# Patient Record
Sex: MALE | Race: BLACK OR AFRICAN AMERICAN | NOT HISPANIC OR LATINO | Employment: STUDENT | URBAN - METROPOLITAN AREA
[De-identification: names, ages, dates, MRNs, and addresses within clinical notes are randomized per-mention and may not be internally consistent; named-entity substitution may affect disease eponyms.]

---
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
## 2022-01-07 ENCOUNTER — HOSPITAL ENCOUNTER (EMERGENCY)
Facility: HOSPITAL | Age: 22
Discharge: HOME | End: 2022-01-07
Attending: EMERGENCY MEDICINE
Payer: MEDICAID

## 2022-01-07 VITALS
DIASTOLIC BLOOD PRESSURE: 83 MMHG | RESPIRATION RATE: 18 BRPM | HEART RATE: 70 BPM | BODY MASS INDEX: 20.81 KG/M2 | SYSTOLIC BLOOD PRESSURE: 143 MMHG | WEIGHT: 145 LBS | TEMPERATURE: 99 F | OXYGEN SATURATION: 99 %

## 2022-01-07 DIAGNOSIS — J02.9 PHARYNGITIS, UNSPECIFIED ETIOLOGY: Primary | ICD-10-CM

## 2022-01-07 DIAGNOSIS — U07.1 COVID: ICD-10-CM

## 2022-01-07 LAB
CTP QC/QA: YES
GROUP A STREP, MOLECULAR: NEGATIVE
SARS-COV-2 RDRP RESP QL NAA+PROBE: POSITIVE

## 2022-01-07 PROCEDURE — 99284 EMERGENCY DEPT VISIT MOD MDM: CPT | Mod: 25

## 2022-01-07 PROCEDURE — 96372 THER/PROPH/DIAG INJ SC/IM: CPT

## 2022-01-07 PROCEDURE — 96374 THER/PROPH/DIAG INJ IV PUSH: CPT

## 2022-01-07 PROCEDURE — 99284 EMERGENCY DEPT VISIT MOD MDM: CPT | Mod: CS,,, | Performed by: PHYSICIAN ASSISTANT

## 2022-01-07 PROCEDURE — 99284 PR EMERGENCY DEPT VISIT,LEVEL IV: ICD-10-PCS | Mod: CS,,, | Performed by: PHYSICIAN ASSISTANT

## 2022-01-07 PROCEDURE — 87651 STREP A DNA AMP PROBE: CPT | Performed by: PHYSICIAN ASSISTANT

## 2022-01-07 PROCEDURE — U0002 COVID-19 LAB TEST NON-CDC: HCPCS | Performed by: PHYSICIAN ASSISTANT

## 2022-01-07 PROCEDURE — 63600175 PHARM REV CODE 636 W HCPCS: Mod: JG | Performed by: PHYSICIAN ASSISTANT

## 2022-01-07 RX ORDER — DEXAMETHASONE SODIUM PHOSPHATE 4 MG/ML
8 INJECTION, SOLUTION INTRA-ARTICULAR; INTRALESIONAL; INTRAMUSCULAR; INTRAVENOUS; SOFT TISSUE
Status: COMPLETED | OUTPATIENT
Start: 2022-01-07 | End: 2022-01-07

## 2022-01-07 RX ADMIN — PENICILLIN G BENZATHINE AND PENICILLIN G PROCAINE 1.2 MILLION UNITS: 600000; 600000 INJECTION, SUSPENSION INTRAMUSCULAR at 03:01

## 2022-01-07 RX ADMIN — DEXAMETHASONE SODIUM PHOSPHATE 8 MG: 4 INJECTION INTRA-ARTICULAR; INTRALESIONAL; INTRAMUSCULAR; INTRAVENOUS; SOFT TISSUE at 02:01

## 2022-01-07 NOTE — DISCHARGE INSTRUCTIONS
Monitor your symptoms.  Take over-the-counter medication for symptoms such as decongestants, cough suppressants, sore throat medication, etc.  Take acetaminophen/Tylenol 650 mg every 6 hr for pain relief for fever reduction.  You can take ibuprofen 600 mg every 6 hr for additional relief.  Rest.  Stay hydrated.  Continue to social isolate.  Quarantine for 5 days after the 1st day of symptom onset or 72 hr after your last fever without fever reducing medication. You will need to wear you mask for an additional 5 days after that.  If you developed shortness of breath, please  a pulse oximeter that is over-the-counter.  Return to the ER or follow up if you oxygen saturations of 93% or below on room air.   Return to the ED for any concerning signs or symptoms.

## 2022-01-07 NOTE — ED NOTES
Patient identifiers verified and correct for MR Burleson  C/C: Sore Throat SEE NN  APPEARANCE: awake and alert in NAD.  SKIN: warm, dry and intact. No breakdown or bruising.  MUSCULOSKELETAL: Patient moving all extremities spontaneously, no obvious swelling or deformities noted. Ambulates independently.  RESPIRATORY: Denies shortness of breath.Respirations unlabored.   CARDIAC: Denies CP, 2+ distal pulses; no peripheral edema  ABDOMEN: S/ND/NT, Denies nausea  : voids spontaneously, denies difficulty  Neurologic: AAO x 4; follows commands equal strength in all extremities; denies numbness/tingling. Denies dizziness denies weakness, reports right sided throat pain

## 2022-01-07 NOTE — ED PROVIDER NOTES
Encounter Date: 1/7/2022       History     Chief Complaint   Patient presents with    Sore Throat     Hx of issues with tonsils     21-year-old male with history of asthma, eczema presents to the emergency department for sore throat.  Patient reports 4 days of sore throat as well as subjective fever and chills 2 days ago.  Patient states that his pain with swallowing which is worse on the right side.  Reports that he has a history of tonsil infections in the past and was admitted for peritonsillar abscess 3 years ago.  Patient denies any changes in phonation, drooling, shortness of breath or trismus.        Review of patient's allergies indicates:   Allergen Reactions    Pineapple Itching and Swelling     Past Medical History:   Diagnosis Date    ADHD     Asthma     Eczema     Groin swelling     2012; had u/s - results     Past Surgical History:   Procedure Laterality Date    CIRCUMCISION      CIRCUMCISION, PRIMARY      TESTICLE SURGERY       History reviewed. No pertinent family history.  Social History     Tobacco Use    Smoking status: Passive Smoke Exposure - Never Smoker    Smokeless tobacco: Never Used   Substance Use Topics    Alcohol use: No    Drug use: Yes     Types: Marijuana     Review of Systems   Constitutional: Positive for chills and fever.   HENT: Positive for sore throat. Negative for rhinorrhea.    Eyes: Negative for pain and visual disturbance.   Respiratory: Negative for cough and shortness of breath.    Cardiovascular: Negative for chest pain.   Gastrointestinal: Negative for abdominal pain, diarrhea, nausea and vomiting.   Endocrine: Negative.    Genitourinary: Negative for difficulty urinating and dysuria.   Musculoskeletal: Negative.    Skin: Negative.    Allergic/Immunologic: Negative for immunocompromised state.   Neurological: Negative for headaches.   Hematological: Negative for adenopathy.   Psychiatric/Behavioral: Negative for confusion.       Physical Exam     Initial  Vitals [01/07/22 1316]   BP Pulse Resp Temp SpO2   (!) 143/83 (!) 56 18 98.6 °F (37 °C) 99 %      MAP       --         Physical Exam    Nursing note and vitals reviewed.  Constitutional: He appears well-developed and well-nourished. He is not diaphoretic. No distress.   HENT:   Head: Normocephalic and atraumatic.   Airway patent, no stridor, drooling or trismus   Eyes: Conjunctivae are normal. Pupils are equal, round, and reactive to light.   Neck: Neck supple.   Shoddy anterior cervical lymphadenopathy   Normal range of motion.  Cardiovascular: Normal rate, regular rhythm, normal heart sounds and intact distal pulses.   Pulmonary/Chest: Breath sounds normal.   Abdominal: Abdomen is soft. Bowel sounds are normal.   Musculoskeletal:         General: Normal range of motion.      Cervical back: Normal range of motion and neck supple.     Neurological: He is alert and oriented to person, place, and time. GCS score is 15. GCS eye subscore is 4. GCS verbal subscore is 5. GCS motor subscore is 6.   Skin: Skin is warm and dry. Capillary refill takes less than 2 seconds.   Psychiatric: He has a normal mood and affect.         ED Course   Procedures  Labs Reviewed   SARS-COV-2 RDRP GENE - Abnormal; Notable for the following components:       Result Value    POC Rapid COVID Positive (*)     All other components within normal limits    Narrative:     This test utilizes isothermal nucleic acid amplification   technology to detect the SARS-CoV-2 RdRp nucleic acid segment.   The analytical sensitivity (limit of detection) is 125 genome   equivalents/mL.   A POSITIVE result implies infection with the SARS-CoV-2 virus;   the patient is presumed to be contagious.     A NEGATIVE result means that SARS-CoV-2 nucleic acids are not   present above the limit of detection. A NEGATIVE result should be   treated as presumptive. It does not rule out the possibility of   COVID-19 and should not be the sole basis for treatment decisions.   If  COVID-19 is strongly suspected based on clinical and exposure   history, re-testing using an alternate molecular assay should be   considered.   This test is only for use under the Food and Drug   Administration s Emergency Use Authorization (EUA).   Commercial kits are provided by [employer].   Performance characteristics of the EUA have been independently   verified by [facility] Department of   Pathology and Laboratory Medicine.   _________________________________________________________________   The authorized Fact Sheet for Healthcare Providers and the authorized Fact   Sheet for Patients of the ID NOW COVID-19 are available on the FDA   website:     https://www.fda.gov/media/411615/download  https://www.fda.gov/media/611069/download       GROUP A STREP, MOLECULAR          Imaging Results    None          Medications   dexamethasone injection 8 mg (8 mg Intravenous Given 1/7/22 1426)   penicillin G procaine-penicillin G benzathine (BICILLIN-CR) injection 1.2 Million Units (1.2 Million Units Intramuscular Given 1/7/22 9179)     Medical Decision Making:   History:   Old Medical Records: I decided to obtain old medical records.  Old Records Summarized: records from clinic visits and records from previous admission(s).  Clinical Tests:   Lab Tests: Ordered and Reviewed       APC / Resident Notes:   21 y.o. year old male presenting with sore throat and subjective fever/chills x4 days .  On exam patient is afebrile and nontoxic. HEENT exam 2+ tonsillar swelling with bilateral exudate. Heart rate and rhythm are regular. Lungs with clear breath sounds throughout. Abdomen is soft, nontender. No edema.    DDx includes but is not limited to strep pharyngitis, URI, COVID    ED workup reveals COVID positive, strep negative.  Will treat with Decadron and Bicillin due clinical exam and history of recurrent strep and previous admission for peritonsillar abscess.  Patient found to be COVID-19 positive full  discharge instructions for home quarantine.  Patient return precautions for any new or worsening symptoms.      Discussed findings and plan with patient who verbalized understanding and agrees with the plan and course of treatment. Return to ED precautions discussed. Patient is stable for discharge. I discussed the care of this patient with my supervising physician.         Attending Attestation:     Physician Attestation Statement for NP/PA:   I discussed this assessment and plan of this patient with the NP/PA, but I did not personally examine the patient. The face to face encounter was performed by the NP/PA.                       Clinical Impression:   Final diagnoses:  [J02.9] Pharyngitis, unspecified etiology (Primary)  [U07.1] COVID          ED Disposition Condition    Discharge Stable        ED Prescriptions     None        Follow-up Information     Follow up With Specialties Details Why Contact Info    Rina Wellington MD Pediatrics  As needed [address]  [facility] [address]  [phone]             Latrell Garibay PA-C  01/07/22 2766       Casandra Kevin Jr., MD  01/08/22 1411

## 2022-07-29 ENCOUNTER — HOSPITAL ENCOUNTER (EMERGENCY)
Facility: HOSPITAL | Age: 22
Discharge: HOME | End: 2022-07-29
Attending: EMERGENCY MEDICINE
Payer: MEDICAID

## 2022-07-29 VITALS
RESPIRATION RATE: 20 BRPM | BODY MASS INDEX: 20.73 KG/M2 | HEIGHT: 69 IN | TEMPERATURE: 98 F | SYSTOLIC BLOOD PRESSURE: 135 MMHG | WEIGHT: 140 LBS | OXYGEN SATURATION: 100 % | HEART RATE: 78 BPM | DIASTOLIC BLOOD PRESSURE: 84 MMHG

## 2022-07-29 DIAGNOSIS — U07.1 COVID-19 VIRUS INFECTION: Primary | ICD-10-CM

## 2022-07-29 LAB
CTP QC/QA: YES
SARS-COV-2 RDRP RESP QL NAA+PROBE: POSITIVE

## 2022-07-29 PROCEDURE — 99282 EMERGENCY DEPT VISIT SF MDM: CPT

## 2022-07-29 PROCEDURE — U0002 COVID-19 LAB TEST NON-CDC: HCPCS | Performed by: STUDENT IN AN ORGANIZED HEALTH CARE EDUCATION/TRAINING PROGRAM

## 2022-07-29 PROCEDURE — 99281 EMR DPT VST MAYX REQ PHY/QHP: CPT

## 2022-07-29 NOTE — ED PROVIDER NOTES
"Encounter Date: 7/29/2022       History     Chief Complaint   Patient presents with    Fever     Subjective fever this am with generalized headache. 7/10 HA with: "achy back" that started today. Denies unilateral weakness, numbness, tingling, and dizziness.      Dayton Burleson is a 22 y.o. male who  has a past medical history of ADHD, Asthma, Eczema, and Groin swelling.    The patient presents to the ED due to subjective fever and body aches.  Patient reports symptoms started this morning. He work up feeling warm and has had some chills today as well. He did not take any medications for his symptoms.   He reports his mother ran fever recently, but she did not get evaluated.   He reports a mild runny nose, but denies any N/V/D, sore throat, cough, rash, urinary complaints, or any other concerns.   Denies any other recent illness.         Review of patient's allergies indicates:   Allergen Reactions    Pineapple Itching and Swelling     Past Medical History:   Diagnosis Date    ADHD     Asthma     Eczema     Groin swelling     2012; had u/s - results     Past Surgical History:   Procedure Laterality Date    CIRCUMCISION      CIRCUMCISION, PRIMARY      TESTICLE SURGERY       History reviewed. No pertinent family history.  Social History     Tobacco Use    Smoking status: Passive Smoke Exposure - Never Smoker    Smokeless tobacco: Never Used   Substance Use Topics    Alcohol use: No    Drug use: Yes     Types: Marijuana     Review of Systems   Constitutional: Positive for chills and fever.   HENT: Positive for rhinorrhea. Negative for sore throat.    Respiratory: Negative for shortness of breath.    Cardiovascular: Negative for chest pain.   Gastrointestinal: Negative for constipation, diarrhea, nausea and vomiting.   Genitourinary: Negative for dysuria, frequency and urgency.   Musculoskeletal: Positive for myalgias. Negative for back pain.   Skin: Negative for rash and wound.   Neurological: Negative for " weakness.   Hematological: Does not bruise/bleed easily.   Psychiatric/Behavioral: Negative for agitation, behavioral problems and confusion.       Physical Exam     Initial Vitals [07/29/22 1757]   BP Pulse Resp Temp SpO2   135/84 78 20 98.4 °F (36.9 °C) 100 %      MAP       --         Physical Exam    Nursing note and vitals reviewed.  Constitutional: He appears well-developed and well-nourished. He is not diaphoretic. No distress.   Well-appearing, no distress.    HENT:   Head: Normocephalic and atraumatic.   Mouth/Throat: Oropharynx is clear and moist.   Eyes: EOM are normal. Pupils are equal, round, and reactive to light.   Neck: No tracheal deviation present.   Cardiovascular: Normal rate, regular rhythm, normal heart sounds and intact distal pulses.   Pulmonary/Chest: Breath sounds normal. No stridor. No respiratory distress.   Abdominal: Abdomen is soft. He exhibits no distension and no mass. There is no abdominal tenderness.   Musculoskeletal:         General: No edema. Normal range of motion.      Cervical back: Normal. No bony tenderness. Normal range of motion.      Thoracic back: Normal. No bony tenderness. Normal range of motion.      Lumbar back: Tenderness present. No bony tenderness. Normal range of motion.        Back:       Comments: Mild muscular tenderness to neck and lower back.      Neurological: He is alert and oriented to person, place, and time. No cranial nerve deficit or sensory deficit.   Skin: Skin is warm and dry. Capillary refill takes less than 2 seconds. No rash noted.   Psychiatric: He has a normal mood and affect. His behavior is normal. Thought content normal.         ED Course   Procedures  Labs Reviewed   SARS-COV-2 RDRP GENE - Abnormal; Notable for the following components:       Result Value    POC Rapid COVID Positive (*)     All other components within normal limits          Imaging Results    None          Medications - No data to display  Medical Decision Making:    History:   Old Medical Records: I decided to obtain old medical records.  Old Records Summarized: records from clinic visits and other records.       <> Summary of Records: Seen by [facility] Clinic 3/2022 for sore throat, dysuria. GC/CZ negative.   Initial Assessment:   23 yo M with subjective fever, body aches.   COVID +. No hypoxia.  Will DC with supportive care.   Differential Diagnosis:   Differential Diagnosis includes, but is not limited to:  COVID-19 infection, influenza, bacterial sinusitis, allergic rhinitis, bacterial/viral pharyngitis, peritonsillar abscess, retropharyngeal abscess, bacterial/viral pneumonia.    Clinical Tests:   Lab Tests: Ordered and Reviewed  ED Management:  After complete evaluation, including thorough history and physical exam, patient's symptoms and presentation is most consistent with viral respiratory illness due to COVID-19.    Patient monitored in the ED and has remained stable on room air.  Vital signs do not indicate sepsis/severe infection, hypoxia, tachypnea, or respiratory distress, and in my professional opinion the benefits of hospital admission/observation DO NOT outweigh the risks of hospital-associated exposures, and the patient is well enough for discharge home with supportive care. The patient was counseled appropriately and provided with instructions for social isolation/home quarantine, hand washing, and symptomatic treatment. The patient was also given a work excuse, if needed. ED return precautions, including worsening symptoms or severe dyspnea, were discussed with the patient, who expressed understanding and is comfortable with plan at this time.      On re-evaluation, the patient's status has improved.  After complete ED evaluation, clinical impression is most consistent with COVID infection.  PCP follow-up within 2-3 days was recommended.    After taking into careful account the patient's history, physical exam findings, as well as empirical and objective data  obtained throughout ED workup, I feel no emergent medical condition has been identified. No further evaluation or admission was felt to be required, and the patient is stable for discharge from the ED. The patient and any additional family present were updated with test results, overall clinical impression, and recommended further plan of care, including discharge instructions as provided and outpatient follow-up for continued evaluation and management as needed. All questions were answered. The patient expressed understanding and agreed with current plan for discharge and follow-up plan of care. Strict ED return precautions were provided, including return/worsening of current symptoms, new symptoms, or any other concerns.                        Clinical Impression:   Final diagnoses:  [U07.1] COVID-19 virus infection (Primary)            ED Disposition Condition    Discharge Stable        ED Prescriptions     None        Follow-up Information     Follow up With Specialties Details Why Contact Info    Rina Wellington MD Pediatrics Schedule an appointment as soon as possible for a visit   [address]  [facility] [address]  [phone]             Chilo Delarosa MD  07/29/22 9746

## 2022-07-29 NOTE — Clinical Note
"Dayton "Neo Burleson was seen and treated in our emergency department on 7/29/2022.     COVID-19 is present in our communities across the state. There is limited testing for COVID at this time, so not all patients can be tested. In this situation, your employee meets the following criteria:    Dayton Burleson has met the criteria for COVID-19 testing and has a POSITIVE result. He can return to work once they are asymptomatic for 24 hours without the use of fever reducing medications AND at least five days from the first positive result. A mask is recommended for 5 days post quarantine.     If you have any questions or concerns, or if I can be of further assistance, please do not hesitate to contact me.    Sincerely,             Chilo Delarosa MD"

## 2022-07-29 NOTE — ED TRIAGE NOTES
Headache, generalized body aches with subjective fever that started this am. Denies diarrhea, n/v.     Two patient identifiers have been checked and are correct.      Appearance: Pt awake, alert & oriented to person, place & time. Pt in no acute distress at present time. Pt is clean and well groomed with clothes appropriately fastened.   Skin: Skin warm, dry & intact. Color consistent with ethnicity. Mucous membranes moist. No breakdown or brusing noted.   Musculoskeletal: Patient moving all extremities well, no obvious swelling or deformities noted.   Respiratory: Respirations spontaneous, even, and non-labored. Visible chest rise noted. Airway is open and patent. No accessory muscle use noted.   Neurologic: Sensation is intact. Speech is clear and appropriate. Eyes open spontaneously, behavior appropriate to situation, follows commands, facial expression symmetrical, bilateral hand grasp equal and even, purposeful motor response noted.  Cardiac: All peripheral pulses present. No Bilateral lower extremity edema. Cap refill is <3 seconds.  Abdomen: Abdomen soft, non-tender to palpation.   : Pt reports no dysuria or hematuria.

## 2024-08-10 ENCOUNTER — HOSPITAL ENCOUNTER (EMERGENCY)
Facility: HOSPITAL | Age: 24
Discharge: HOME | End: 2024-08-10
Attending: EMERGENCY MEDICINE
Payer: MEDICAID

## 2024-08-10 VITALS
WEIGHT: 139.75 LBS | SYSTOLIC BLOOD PRESSURE: 134 MMHG | HEART RATE: 80 BPM | TEMPERATURE: 99 F | HEIGHT: 69 IN | OXYGEN SATURATION: 100 % | BODY MASS INDEX: 20.7 KG/M2 | DIASTOLIC BLOOD PRESSURE: 63 MMHG | RESPIRATION RATE: 20 BRPM

## 2024-08-10 DIAGNOSIS — J03.90 TONSILLITIS: ICD-10-CM

## 2024-08-10 DIAGNOSIS — J36 ABSCESS, INTRATONSILLAR: Primary | ICD-10-CM

## 2024-08-10 LAB
ALBUMIN SERPL BCP-MCNC: 4.3 G/DL (ref 3.5–5.2)
ALP SERPL-CCNC: 79 U/L (ref 55–135)
ALT SERPL W/O P-5'-P-CCNC: 15 U/L (ref 10–44)
ANION GAP SERPL CALC-SCNC: 11 MMOL/L (ref 8–16)
AST SERPL-CCNC: 15 U/L (ref 10–40)
BASOPHILS # BLD AUTO: 0.02 K/UL (ref 0–0.2)
BASOPHILS NFR BLD: 0.2 % (ref 0–1.9)
BILIRUB SERPL-MCNC: 0.7 MG/DL (ref 0.1–1)
BUN SERPL-MCNC: 7 MG/DL (ref 6–20)
CALCIUM SERPL-MCNC: 9.7 MG/DL (ref 8.7–10.5)
CHLORIDE SERPL-SCNC: 108 MMOL/L (ref 95–110)
CO2 SERPL-SCNC: 21 MMOL/L (ref 23–29)
CREAT SERPL-MCNC: 0.8 MG/DL (ref 0.5–1.4)
DIFFERENTIAL METHOD BLD: ABNORMAL
EOSINOPHIL # BLD AUTO: 0.2 K/UL (ref 0–0.5)
EOSINOPHIL NFR BLD: 1.5 % (ref 0–8)
ERYTHROCYTE [DISTWIDTH] IN BLOOD BY AUTOMATED COUNT: 11.5 % (ref 11.5–14.5)
EST. GFR  (NO RACE VARIABLE): >60 ML/MIN/1.73 M^2
GLUCOSE SERPL-MCNC: 81 MG/DL (ref 70–110)
GROUP A STREP, MOLECULAR: NEGATIVE
HCT VFR BLD AUTO: 41.7 % (ref 40–54)
HCV AB SERPL QL IA: NORMAL
HGB BLD-MCNC: 14 G/DL (ref 14–18)
HIV 1+2 AB+HIV1 P24 AG SERPL QL IA: NORMAL
IMM GRANULOCYTES # BLD AUTO: 0.03 K/UL (ref 0–0.04)
IMM GRANULOCYTES NFR BLD AUTO: 0.3 % (ref 0–0.5)
LYMPHOCYTES # BLD AUTO: 1.5 K/UL (ref 1–4.8)
LYMPHOCYTES NFR BLD: 12.5 % (ref 18–48)
MCH RBC QN AUTO: 32 PG (ref 27–31)
MCHC RBC AUTO-ENTMCNC: 33.6 G/DL (ref 32–36)
MCV RBC AUTO: 95 FL (ref 82–98)
MONOCYTES # BLD AUTO: 1.2 K/UL (ref 0.3–1)
MONOCYTES NFR BLD: 9.8 % (ref 4–15)
NEUTROPHILS # BLD AUTO: 8.9 K/UL (ref 1.8–7.7)
NEUTROPHILS NFR BLD: 75.7 % (ref 38–73)
NRBC BLD-RTO: 0 /100 WBC
PLATELET # BLD AUTO: 170 K/UL (ref 150–450)
PMV BLD AUTO: 10.2 FL (ref 9.2–12.9)
POTASSIUM SERPL-SCNC: 3.8 MMOL/L (ref 3.5–5.1)
PROT SERPL-MCNC: 7.9 G/DL (ref 6–8.4)
RBC # BLD AUTO: 4.38 M/UL (ref 4.6–6.2)
SODIUM SERPL-SCNC: 140 MMOL/L (ref 136–145)
WBC # BLD AUTO: 11.68 K/UL (ref 3.9–12.7)

## 2024-08-10 PROCEDURE — 86803 HEPATITIS C AB TEST: CPT | Performed by: EMERGENCY MEDICINE

## 2024-08-10 PROCEDURE — 99285 EMERGENCY DEPT VISIT HI MDM: CPT | Mod: 25

## 2024-08-10 PROCEDURE — 96361 HYDRATE IV INFUSION ADD-ON: CPT

## 2024-08-10 PROCEDURE — 80053 COMPREHEN METABOLIC PANEL: CPT | Performed by: PHYSICIAN ASSISTANT

## 2024-08-10 PROCEDURE — 87389 HIV-1 AG W/HIV-1&-2 AB AG IA: CPT | Performed by: EMERGENCY MEDICINE

## 2024-08-10 PROCEDURE — 63600175 PHARM REV CODE 636 W HCPCS: Performed by: PHYSICIAN ASSISTANT

## 2024-08-10 PROCEDURE — 25500020 PHARM REV CODE 255: Performed by: EMERGENCY MEDICINE

## 2024-08-10 PROCEDURE — 96375 TX/PRO/DX INJ NEW DRUG ADDON: CPT | Mod: 59

## 2024-08-10 PROCEDURE — 85025 COMPLETE CBC W/AUTO DIFF WBC: CPT | Performed by: PHYSICIAN ASSISTANT

## 2024-08-10 PROCEDURE — 87651 STREP A DNA AMP PROBE: CPT | Performed by: PHYSICIAN ASSISTANT

## 2024-08-10 PROCEDURE — 25000003 PHARM REV CODE 250: Performed by: PHYSICIAN ASSISTANT

## 2024-08-10 PROCEDURE — 96374 THER/PROPH/DIAG INJ IV PUSH: CPT

## 2024-08-10 RX ORDER — KETOROLAC TROMETHAMINE 30 MG/ML
15 INJECTION, SOLUTION INTRAMUSCULAR; INTRAVENOUS
Status: COMPLETED | OUTPATIENT
Start: 2024-08-10 | End: 2024-08-10

## 2024-08-10 RX ORDER — DEXAMETHASONE SODIUM PHOSPHATE 4 MG/ML
8 INJECTION, SOLUTION INTRA-ARTICULAR; INTRALESIONAL; INTRAMUSCULAR; INTRAVENOUS; SOFT TISSUE
Status: COMPLETED | OUTPATIENT
Start: 2024-08-10 | End: 2024-08-10

## 2024-08-10 RX ORDER — AMOXICILLIN AND CLAVULANATE POTASSIUM 875; 125 MG/1; MG/1
1 TABLET, FILM COATED ORAL 2 TIMES DAILY
Qty: 20 TABLET | Refills: 0 | Status: SHIPPED | OUTPATIENT
Start: 2024-08-10 | End: 2024-08-20

## 2024-08-10 RX ORDER — AMOXICILLIN AND CLAVULANATE POTASSIUM 875; 125 MG/1; MG/1
1 TABLET, FILM COATED ORAL
Status: COMPLETED | OUTPATIENT
Start: 2024-08-10 | End: 2024-08-10

## 2024-08-10 RX ORDER — METHYLPREDNISOLONE 4 MG/1
TABLET ORAL
Qty: 21 EACH | Refills: 0 | Status: SHIPPED | OUTPATIENT
Start: 2024-08-10 | End: 2024-08-31

## 2024-08-10 RX ADMIN — SODIUM CHLORIDE 1000 ML: 9 INJECTION, SOLUTION INTRAVENOUS at 11:08

## 2024-08-10 RX ADMIN — IOHEXOL 75 ML: 350 INJECTION, SOLUTION INTRAVENOUS at 12:08

## 2024-08-10 RX ADMIN — AMOXICILLIN AND CLAVULANATE POTASSIUM 1 TABLET: 875; 125 TABLET, FILM COATED ORAL at 02:08

## 2024-08-10 RX ADMIN — KETOROLAC TROMETHAMINE 15 MG: 30 INJECTION, SOLUTION INTRAMUSCULAR at 11:08

## 2024-08-10 RX ADMIN — DEXAMETHASONE SODIUM PHOSPHATE 8 MG: 4 INJECTION INTRA-ARTICULAR; INTRALESIONAL; INTRAMUSCULAR; INTRAVENOUS; SOFT TISSUE at 02:08

## 2024-08-10 NOTE — DISCHARGE INSTRUCTIONS
You were diagnosed with an intratonsillar abscess and cellulitis     This will be treated with an antibiotic known as Augmentin and steroids (Medrol Dosepak)    You may take Tylenol over-the-counter as needed for fever    Strict ED precautions given to return immediately for new, worsening, or concerning symptoms including but not limited to fever unrelieved with Tylenol, difficulty swallowing or tolerating her own secretions, worsening of your symptoms

## 2024-08-10 NOTE — ED PROVIDER NOTES
Encounter Date: 8/10/2024       History     Chief Complaint   Patient presents with    Oral Swelling     Hx of PTA. +change in phonation and difficulty swallowing own secretions      24-year-old male with a PMHx of peritonsillar abscess presents to ED with sore throat x3 days.  Sore throat started to improve yesterday though he woke up this morning with right-sided sore throat.  He has pain with swallowing and nasal congestion.  He is able to swallow water and tolerate his own secretions.  He denies fever, cough, neck stiffness.    The history is provided by the patient.     Review of patient's allergies indicates:   Allergen Reactions    Pineapple Itching and Swelling     Past Medical History:   Diagnosis Date    ADHD     Asthma     Eczema     Groin swelling     2012; had u/s - results     Past Surgical History:   Procedure Laterality Date    CIRCUMCISION      CIRCUMCISION, PRIMARY      TESTICLE SURGERY       No family history on file.  Social History     Tobacco Use    Smoking status: Passive Smoke Exposure - Never Smoker    Smokeless tobacco: Never   Substance Use Topics    Alcohol use: No    Drug use: Yes     Types: Marijuana     Review of Systems   Constitutional:  Negative for chills and fever.   HENT:  Positive for congestion, postnasal drip, sore throat and trouble swallowing.    Respiratory:  Negative for cough.        Physical Exam     Initial Vitals [08/10/24 1004]   BP Pulse Resp Temp SpO2   136/88 78 19 99 °F (37.2 °C) 96 %      MAP       --         Physical Exam    Nursing note and vitals reviewed.  Constitutional: He appears well-developed and well-nourished. He is not diaphoretic. No distress.   HENT:   Head: Normocephalic and atraumatic.   Nose: Nose normal.   2+ tonsils bilaterally.  Exudate and erythema noted on the right side with uvula deviation.  He is tolerating his own secretions.  Normal phonation.  No tripoding.   Eyes: Conjunctivae and EOM are normal.   Neck: Neck supple.   Cardiovascular:   Normal rate.           Pulmonary/Chest: No respiratory distress.   Musculoskeletal:      Cervical back: Neck supple.     Lymphadenopathy:     He has cervical adenopathy.   Neurological: He is alert and oriented to person, place, and time.   Skin: No rash noted.   Psychiatric: He has a normal mood and affect. Thought content normal.         ED Course   Procedures  Labs Reviewed   CBC W/ AUTO DIFFERENTIAL - Abnormal       Result Value    WBC 11.68      RBC 4.38 (*)     Hemoglobin 14.0      Hematocrit 41.7      MCV 95      MCH 32.0 (*)     MCHC 33.6      RDW 11.5      Platelets 170      MPV 10.2      Immature Granulocytes 0.3      Gran # (ANC) 8.9 (*)     Immature Grans (Abs) 0.03      Lymph # 1.5      Mono # 1.2 (*)     Eos # 0.2      Baso # 0.02      nRBC 0      Gran % 75.7 (*)     Lymph % 12.5 (*)     Mono % 9.8      Eosinophil % 1.5      Basophil % 0.2      Differential Method Automated     COMPREHENSIVE METABOLIC PANEL - Abnormal    Sodium 140      Potassium 3.8      Chloride 108      CO2 21 (*)     Glucose 81      BUN 7      Creatinine 0.8      Calcium 9.7      Total Protein 7.9      Albumin 4.3      Total Bilirubin 0.7      Alkaline Phosphatase 79      AST 15      ALT 15      eGFR >60.0      Anion Gap 11     GROUP A STREP, MOLECULAR   HIV 1 / 2 ANTIBODY    HIV 1/2 Ag/Ab Non-reactive      Narrative:     Release to patient->Immediate   HEPATITIS C ANTIBODY    Hepatitis C Ab Non-reactive      Narrative:     Release to patient->Immediate          Imaging Results              CT Soft Tissue Neck With Contrast (Final result)  Result time 08/10/24 12:38:41      Final result by Chuck Persaud MD (08/10/24 12:38:41)                   Impression:      Right-sided tonsillitis with small peritonsillar abscess along the anterior superior margin of the tonsil.  Prominent surrounding edema and cellulitis extending to the floor of mouth submandibular space and right lateral oropharyngeal wall including the  pharyngoepiglottic fold.      Electronically signed by: Chuck Persaud  Date:    08/10/2024  Time:    12:38               Narrative:    EXAMINATION:  CT SOFT TISSUE NECK WITH CONTRAST    CLINICAL HISTORY:  Epiglottitis or tonsillitis suspected;Evaluate for right-sided peritonsillar abscess;    TECHNIQUE:  Low dose axial images as well as sagittal and coronal reconstructions were performed from the skull base to the clavicles following the intravenous administration of 75 mL of Omnipaque 350.    COMPARISON:  None    FINDINGS:  There is swelling and enhancement of the right palatine tonsil with prominent surrounding edema and cellulitis extending into the parapharyngeal space, floor mouth, submandibular region, and lateral oropharyngeal wall including the pharyngoepiglottic fold.  A small peritonsillar abscess is noted along the anterior superior margin of the gland measuring 12 mm.    The major vascular structures are patent.  Reactive adenopathy.    The visualized sinuses and mastoid air cells are clear.  No osseous destructive process.                                       Medications   amoxicillin-clavulanate 875-125mg per tablet 1 tablet (has no administration in time range)   dexAMETHasone injection 8 mg (has no administration in time range)   ketorolac injection 15 mg (15 mg Intravenous Given 8/10/24 1148)   sodium chloride 0.9% bolus 1,000 mL 1,000 mL (0 mLs Intravenous Stopped 8/10/24 1341)   iohexoL (OMNIPAQUE 350) injection 75 mL (75 mLs Intravenous Given 8/10/24 1214)     Medical Decision Making  24-year-old male with a PMHx of peritonsillar abscess presents to emergency department with sore throat x3 days. Nontoxic appearing. Hemodynamically stable. Afebrile. Exam as above. I will initiate workup and reassess.    Ddx:  Strep tonsillitis, viral tonsillitis, peritonsillar abscess,    Labs without leukocytosis    CT soft tissue neck shows right-sided tonsillitis with small peritonsillar abscess. Prominent  surrounding edema and cellulitis extending to the floor of mouth submandibular space and right lateral oropharyngeal wall including the pharyngoepiglottic fold    I discussed imaging with ENT who evaluated CT soft tissue neck.  Abscess appears intratonsillar, and ENT does not recommend I&D at this time.  I plan to treat with Augmentin and Medrol.  He is hemodynamically stable without red flag signs such as drooling, tripoding, hot potato voice.  I believe he is stable at this time for discharge. Strict ED precautions given to return immediately for new, worsening, or concerning symptoms including but not limited to fever unrelieved with Tylenol, difficulty swallowing or tolerating her own secretions, worsening of your symptoms    Amount and/or Complexity of Data Reviewed  Labs: ordered. Decision-making details documented in ED Course.  Radiology: ordered.    Risk  Prescription drug management.               ED Course as of 08/10/24 1419   Sat Aug 10, 2024   1207 WBC: 11.68 [HM]   1207 Hemoglobin: 14.0 [HM]   1207 Hematocrit: 41.7 [HM]   1408 Group A Strep, Molecular: Negative [HM]      ED Course User Index  [HM] Erika Montoya PA-C                           Clinical Impression:  Final diagnoses:  [J36] Abscess, intratonsillar (Primary)  [J03.90] Tonsillitis          ED Disposition Condition    Discharge Stable          ED Prescriptions    None       Follow-up Information       Follow up With Specialties Details Why Contact Info    Rina Wellington MD Pediatrics  As needed [address]  [place] [address]  [phone]      [facility] - Emergency Dept Emergency Medicine  If symptoms worsen [address] [facility] [id number] [phone]             Erika Montoya PA-C  08/10/24 1617

## 2024-08-10 NOTE — ED TRIAGE NOTES
Dayton Burleson, a 24 y.o. male presents to the ED w/ complaint of oral swelling x3 days. Pt reports difficulty swallowing and pain to the right side.     Triage note:  Chief Complaint   Patient presents with    Oral Swelling     Hx of PTA. +change in phonation and difficulty swallowing own secretions      Review of patient's allergies indicates:   Allergen Reactions    Pineapple Itching and Swelling     Past Medical History:   Diagnosis Date    ADHD     Asthma     Eczema     Groin swelling     2012; had u/s - results

## 2025-06-02 ENCOUNTER — HOSPITAL ENCOUNTER (EMERGENCY)
Facility: HOSPITAL | Age: 25
Discharge: HOME | End: 2025-06-02
Attending: EMERGENCY MEDICINE
Payer: MEDICAID

## 2025-06-02 VITALS
RESPIRATION RATE: 18 BRPM | DIASTOLIC BLOOD PRESSURE: 53 MMHG | HEIGHT: 69 IN | OXYGEN SATURATION: 100 % | WEIGHT: 142 LBS | SYSTOLIC BLOOD PRESSURE: 114 MMHG | BODY MASS INDEX: 21.03 KG/M2 | HEART RATE: 58 BPM | TEMPERATURE: 99 F

## 2025-06-02 DIAGNOSIS — J02.9 VIRAL PHARYNGITIS: Primary | ICD-10-CM

## 2025-06-02 DIAGNOSIS — B30.9 VIRAL CONJUNCTIVITIS OF BOTH EYES: ICD-10-CM

## 2025-06-02 LAB — GROUP A STREP MOLECULAR (OHS): NEGATIVE

## 2025-06-02 PROCEDURE — 87651 STREP A DNA AMP PROBE: CPT | Performed by: EMERGENCY MEDICINE

## 2025-06-02 PROCEDURE — 99283 EMERGENCY DEPT VISIT LOW MDM: CPT

## 2025-06-02 PROCEDURE — 25000003 PHARM REV CODE 250: Performed by: EMERGENCY MEDICINE

## 2025-06-02 RX ORDER — IBUPROFEN 400 MG/1
800 TABLET, FILM COATED ORAL
Status: COMPLETED | OUTPATIENT
Start: 2025-06-02 | End: 2025-06-02

## 2025-06-02 RX ADMIN — IBUPROFEN 800 MG: 400 TABLET ORAL at 03:06

## 2025-06-05 ENCOUNTER — TELEPHONE (OUTPATIENT)
Facility: OTHER | Age: 25
End: 2025-06-05
Payer: MEDICAID

## 2025-06-07 ENCOUNTER — HOSPITAL ENCOUNTER (EMERGENCY)
Facility: HOSPITAL | Age: 25
Discharge: HOME | End: 2025-06-07
Attending: EMERGENCY MEDICINE
Payer: MEDICAID

## 2025-06-07 VITALS
BODY MASS INDEX: 20.67 KG/M2 | TEMPERATURE: 98 F | OXYGEN SATURATION: 99 % | WEIGHT: 140 LBS | RESPIRATION RATE: 13 BRPM | DIASTOLIC BLOOD PRESSURE: 91 MMHG | HEART RATE: 71 BPM | SYSTOLIC BLOOD PRESSURE: 125 MMHG

## 2025-06-07 DIAGNOSIS — J36 TONSILLAR ABSCESS: Primary | ICD-10-CM

## 2025-06-07 DIAGNOSIS — J03.90 TONSILLITIS: ICD-10-CM

## 2025-06-07 LAB
BUN SERPL-MCNC: 6 MG/DL (ref 6–30)
CHLORIDE SERPL-SCNC: 99 MMOL/L (ref 95–110)
CREAT SERPL-MCNC: 1 MG/DL (ref 0.5–1.4)
GLUCOSE SERPL-MCNC: 86 MG/DL (ref 70–110)
GROUP A STREP MOLECULAR (OHS): POSITIVE
HCT VFR BLD CALC: 41 %PCV (ref 36–54)
POC IONIZED CALCIUM: 1.2 MMOL/L (ref 1.06–1.42)
POC TCO2 (MEASURED): 28 MMOL/L (ref 23–29)
POTASSIUM BLD-SCNC: 3.6 MMOL/L (ref 3.5–5.1)
SAMPLE: NORMAL
SODIUM BLD-SCNC: 139 MMOL/L (ref 136–145)

## 2025-06-07 PROCEDURE — 96374 THER/PROPH/DIAG INJ IV PUSH: CPT

## 2025-06-07 PROCEDURE — 96375 TX/PRO/DX INJ NEW DRUG ADDON: CPT

## 2025-06-07 PROCEDURE — 80047 BASIC METABLC PNL IONIZED CA: CPT

## 2025-06-07 PROCEDURE — 63600175 PHARM REV CODE 636 W HCPCS: Mod: JZ,TB | Performed by: EMERGENCY MEDICINE

## 2025-06-07 PROCEDURE — 87651 STREP A DNA AMP PROBE: CPT | Performed by: EMERGENCY MEDICINE

## 2025-06-07 PROCEDURE — 25500020 PHARM REV CODE 255: Performed by: EMERGENCY MEDICINE

## 2025-06-07 PROCEDURE — 25000003 PHARM REV CODE 250: Performed by: EMERGENCY MEDICINE

## 2025-06-07 PROCEDURE — 99285 EMERGENCY DEPT VISIT HI MDM: CPT | Mod: 25

## 2025-06-07 RX ORDER — DEXAMETHASONE SODIUM PHOSPHATE 4 MG/ML
8 INJECTION, SOLUTION INTRA-ARTICULAR; INTRALESIONAL; INTRAMUSCULAR; INTRAVENOUS; SOFT TISSUE
Status: COMPLETED | OUTPATIENT
Start: 2025-06-07 | End: 2025-06-07

## 2025-06-07 RX ORDER — KETOROLAC TROMETHAMINE 30 MG/ML
10 INJECTION, SOLUTION INTRAMUSCULAR; INTRAVENOUS
Status: COMPLETED | OUTPATIENT
Start: 2025-06-07 | End: 2025-06-07

## 2025-06-07 RX ORDER — AMOXICILLIN AND CLAVULANATE POTASSIUM 875; 125 MG/1; MG/1
1 TABLET, FILM COATED ORAL
Status: COMPLETED | OUTPATIENT
Start: 2025-06-07 | End: 2025-06-07

## 2025-06-07 RX ORDER — AMOXICILLIN AND CLAVULANATE POTASSIUM 875; 125 MG/1; MG/1
1 TABLET, FILM COATED ORAL EVERY 12 HOURS
Qty: 20 TABLET | Refills: 0 | Status: SHIPPED | OUTPATIENT
Start: 2025-06-07 | End: 2025-06-17

## 2025-06-07 RX ORDER — METHYLPREDNISOLONE 4 MG/1
TABLET ORAL
Qty: 21 EACH | Refills: 0 | Status: SHIPPED | OUTPATIENT
Start: 2025-06-07 | End: 2025-06-28

## 2025-06-07 RX ADMIN — KETOROLAC TROMETHAMINE 10 MG: 30 INJECTION, SOLUTION INTRAMUSCULAR; INTRAVENOUS at 07:06

## 2025-06-07 RX ADMIN — AMOXICILLIN AND CLAVULANATE POTASSIUM 1 TABLET: 875; 125 TABLET, FILM COATED ORAL at 08:06

## 2025-06-07 RX ADMIN — IOHEXOL 75 ML: 350 INJECTION, SOLUTION INTRAVENOUS at 07:06

## 2025-06-07 RX ADMIN — DEXAMETHASONE SODIUM PHOSPHATE 8 MG: 4 INJECTION INTRA-ARTICULAR; INTRALESIONAL; INTRAMUSCULAR; INTRAVENOUS; SOFT TISSUE at 07:06

## 2025-06-08 NOTE — ED NOTES
I-STAT Chem-8+ Results:   Value Reference Range   Sodium 139 136-145 mmol/L   Potassium  3.6 3.5-5.1 mmol/L   Chloride 99  mmol/L   Ionized Calcium 1.20 1.06-1.42 mmol/L   CO2 (measured) 28 23-29 mmol/L   Glucose 86  mg/dL   BUN 6 6-30 mg/dL   Creatinine 1.0 0.5-1.4 mg/dL   Hematocrit 41 36-54%

## 2025-06-08 NOTE — ED TRIAGE NOTES
"Dayton Burleson, a 25 y.o. male presents to the ED w/ complaint of     Triage note:  Chief Complaint   Patient presents with    throat pain     Patient states he came 3 days ago with throat pain and swelling due to an inflamed lymph node. Patient presents with worsening pain today.    Pt reports pain extends to R jaw and R ear. Also endorses "voice changes." No rxs.   Review of patient's allergies indicates:   Allergen Reactions    Pineapple Itching and Swelling     Past Medical History:   Diagnosis Date    ADHD     Asthma     Eczema     Groin swelling     2012; had u/s - results     "

## 2025-06-08 NOTE — DISCHARGE INSTRUCTIONS
Your strep test is positive.  Your CT scan does show a small fluid collection in the right tonsil, these typically are not drained.  It is very similar to your scan before which did not require an I and D.  I will start her on a Medrol Dosepak and discharge with Augmentin.  A referral to ENT has been placed with [facility] and I have provided you with a paper copy of a referral to take to any outside source at his covered by your insurance.

## 2025-06-08 NOTE — ED PROVIDER NOTES
Encounter Date: 6/7/2025       History     Chief Complaint   Patient presents with    throat pain     Patient states he came 3 days ago with throat pain and swelling due to an inflamed lymph node. Patient presents with worsening pain today.      Patient is a 25-year-old male with a past medical history of ADHD, asthma, eczema presenting today with sore throat.  Symptoms started 3 days ago.  He came to the emergency department as he noticed a swollen lymph node in the right side of his neck.  He was diagnosed with a viral pharyngitis.  He has been trying ibuprofen but the pain has gotten worse.  He states pain is constant, in the right side of his throat, radiates down to his neck in the back of his head.  Pain is worse with swallowing, denies shortness of breath, fever or voice change.  Patient states he has had tonsillitis in the past, most recent episode was 1 year ago.        Review of patient's allergies indicates:   Allergen Reactions    Pineapple Itching and Swelling     Past Medical History:   Diagnosis Date    ADHD     Asthma     Eczema     Groin swelling     2012; had u/s - results     Past Surgical History:   Procedure Laterality Date    CIRCUMCISION      CIRCUMCISION, PRIMARY      TESTICLE SURGERY       No family history on file.  Social History[1]  Review of Systems    Physical Exam     Initial Vitals [06/07/25 1910]   BP Pulse Resp Temp SpO2   (!) 125/91 71 13 98.3 °F (36.8 °C) 99 %      MAP       --         Physical Exam    Nursing note and vitals reviewed.  Constitutional: He appears well-developed and well-nourished. No distress.   HENT: Mouth/Throat: Posterior oropharyngeal erythema and tonsillar abscesses (Right peritonsillar fullness with light uvula deviation to the left) present. No oropharyngeal exudate.   Floor of the mouth soft  Mid cervical chain lymphadenopathy  Full range of motion of the neck  No muffled voice, drooling or trismus   Eyes: Conjunctivae are normal.   Neck: Neck supple.    Cardiovascular:  Normal rate, regular rhythm and intact distal pulses.           Pulmonary/Chest: Breath sounds normal. He has no wheezes. He has no rales.   Abdominal: Abdomen is soft. Bowel sounds are normal. There is no abdominal tenderness.   Musculoskeletal:         General: No edema.      Cervical back: Neck supple.     Lymphadenopathy:     He has no cervical adenopathy.   Neurological: He is alert and oriented to person, place, and time.   Skin: No rash noted.   Psychiatric: He has a normal mood and affect.         ED Course   Procedures  Labs Reviewed   GROUP A STREP, MOLECULAR - Abnormal       Result Value    Group A Strep Molecular Positive (*)     Narrative:     Arcanobacterium haemolyticum and Beta Streptococcus group C and G will not be detected by this test method.  Please order Throat Culture ([id number]) if suspected.       ISTAT PROCEDURE    POC Glucose 86      POC BUN 6      POC Creatinine 1.0      POC Sodium 139      POC Potassium 3.6      POC Chloride 99      POC TCO2 (MEASURED) 28      POC Ionized Calcium 1.20      POC Hematocrit 41      Sample CHERELLE     ISTAT CHEM8          Imaging Results              CT Soft Tissue Neck With Contrast (Final result)  Result time 06/07/25 20:11:43      Final result by Jonathon Alexis MD (06/07/25 20:11:43)                   Impression:      Recurrent right sided lateral pharyngeal tonsillar abscess with tonsillitis.    Decreased in surrounding edema with no significant adenopathy.    No evidence of airway obstruction.      Electronically signed by: Jonathon Alexis  Date:    06/07/2025  Time:    20:11               Narrative:    EXAMINATION:  CT SOFT TISSUE NECK WITH CONTRAST    CLINICAL HISTORY:  Epiglottitis or tonsillitis suspected;Neck abscess, deep tissue;    TECHNIQUE:  Low dose axial images as well as sagittal and coronal reconstructions were performed from the skull base to the clavicles following the intravenous administration of 75 mL of Omnipaque  350.    COMPARISON:  08/10/2024    FINDINGS:  There is recurrent tonsillitis with central abscess formation in the right parapharyngeal tonsillar bed with largest necrotic pocket measuring 14 mm.    Airway remains patent.  No new significant adenopathy.  Previous edema within the parapharyngeal space on the right has resolved.    Floor of the mouth and base of the tongue, nasopharynx, oropharynx and intrinsic structures of the larynx and subglottic airway appear stable and otherwise unremarkable.    Muscles of mastication and  space, parotid gland and parotid space, para spinous and prevertebral spaces appear stable and normal.  Paranasal sinuses clear.  Orbits and orbital contents unremarkable.  Intracranial contents unremarkable with no meningeal enhancement.  Lung apices clear.  Upper mediastinum unremarkable.                                       Medications   ketorolac injection 9.999 mg (9.999 mg Intravenous Given 6/7/25 1938)   dexAMETHasone injection 8 mg (8 mg Intravenous Given 6/7/25 1938)   iohexoL (OMNIPAQUE 350) injection 75 mL (75 mLs Intravenous Given 6/7/25 1947)     Medical Decision Making  Emergent evaluation of 25-year-old male presenting today for worsening sore throat x3 days    Vital signs stable, well-appearing, no trismus, drooling or respiratory distress noted on exam.  There is right peritonsillar fullness with erythema and slight deviation of the uvula.    Differential includes but not limited to phlegmon, PTA, viral pharyngitis, tonsillitis.  low suspicion for retropharyngeal abscess    Plan for Decadron, Toradol, soft tissue neck, repeat strep.    Strep is positive.  CT shows intra tonsillar/slight peritonsillar abscess.  This is similar to previous CT 1 year ago.  Will discharge with Medrol Dosepak and Augmentin.  Referral for ENT placed.  Return precautions given.    Amount and/or Complexity of Data Reviewed  Labs: ordered. Decision-making details documented in ED  Course.  Radiology: ordered and independent interpretation performed.    Risk  Prescription drug management.                                      Clinical Impression:  Final diagnoses:  [J36] Tonsillar abscess (Primary)  [J03.90] Tonsillitis          ED Disposition Condition    Discharge Stable          ED Prescriptions       Medication Sig Dispense Start Date End Date Auth. Provider    methylPREDNISolone (MEDROL DOSEPACK) 4 mg tablet Take as prescribed on packet insert 1 each 6/7/2025 6/28/2025 Mitali White MD    amoxicillin-clavulanate 875-125mg (AUGMENTIN) 875-125 mg per tablet Take 1 tablet by mouth every 12 (twelve) hours. for 10 days 20 tablet 6/7/2025 6/17/2025 Mitali White MD          Follow-up Information       Follow up With Specialties Details Why Contact Info    Rina Wellington MD Pediatrics Schedule an appointment as soon as possible for a visit   [address] [facility] [address]  [place] [address]  [phone]      [facility] ENT Otolaryngology   [address] [facility] [address]  [phone]    [facility] - Ent Otolaryngology Schedule an appointment as soon as possible for a visit   [address] [facility] [address]  [phone]                     [1]   Social History  Tobacco Use    Smoking status: Passive Smoke Exposure - Never Smoker    Smokeless tobacco: Never   Substance Use Topics    Alcohol use: No    Drug use: Yes     Types: Marijuana        Mitali White MD  06/07/25 2027